# Patient Record
Sex: MALE | URBAN - METROPOLITAN AREA
[De-identification: names, ages, dates, MRNs, and addresses within clinical notes are randomized per-mention and may not be internally consistent; named-entity substitution may affect disease eponyms.]

---

## 2023-12-28 ENCOUNTER — OFFICE VISIT (OUTPATIENT)
Dept: URBAN - METROPOLITAN AREA CLINIC 54 | Facility: CLINIC | Age: 48
End: 2023-12-28

## 2023-12-28 NOTE — HPI-TODAY'S VISIT:
Agustin Norton is a 48-year-old male with PMH of who was referred to clinic by Timothy Davidson MD for screening colonoscopy.  A copy of this document will be sent to the referring provider.

## 2024-01-04 ENCOUNTER — LAB OUTSIDE AN ENCOUNTER (OUTPATIENT)
Dept: URBAN - METROPOLITAN AREA CLINIC 54 | Facility: CLINIC | Age: 49
End: 2024-01-04

## 2024-01-04 ENCOUNTER — OFFICE VISIT (OUTPATIENT)
Dept: URBAN - METROPOLITAN AREA CLINIC 54 | Facility: CLINIC | Age: 49
End: 2024-01-04
Payer: COMMERCIAL

## 2024-01-04 ENCOUNTER — DASHBOARD ENCOUNTERS (OUTPATIENT)
Age: 49
End: 2024-01-04

## 2024-01-04 VITALS
BODY MASS INDEX: 27.74 KG/M2 | DIASTOLIC BLOOD PRESSURE: 76 MMHG | TEMPERATURE: 98.2 F | SYSTOLIC BLOOD PRESSURE: 120 MMHG | HEART RATE: 57 BPM | HEIGHT: 68 IN | WEIGHT: 183 LBS

## 2024-01-04 DIAGNOSIS — Z12.11 COLON CANCER SCREENING: ICD-10-CM

## 2024-01-04 PROCEDURE — 99204 OFFICE O/P NEW MOD 45 MIN: CPT

## 2024-01-04 PROCEDURE — 99244 OFF/OP CNSLTJ NEW/EST MOD 40: CPT

## 2024-01-04 RX ORDER — SODIUM, POTASSIUM,MAG SULFATES 17.5-3.13G
354 ML AS DIRECTED SOLUTION, RECONSTITUTED, ORAL ORAL
Qty: 354 ML | Refills: 0 | OUTPATIENT
Start: 2024-01-04 | End: 2024-01-05

## 2024-01-04 NOTE — HPI-TODAY'S VISIT:
1/4/24: Patient was referred by Dr. Timothy Davidson for colon cancer screening. A copy of this document will be sent to the provider. Patient is a 47 yo male with a PMH of RIKI, GERD presents for initial screening colonoscopy. Denies any prior examination. Denies known family history of colon cancer. Denies any symptoms of concern. No changes in bowel habits, melena, rectal bleeding, abdominal pain, or unintentional weight loss. Patient denies cardiopulmonary disease and does not have a pacemaker or defibrillator. Denies use of anticoagulants or antiplatelets. CBC, CMP, and lipid panel were WNL last month. No recent imaging.

## 2024-01-26 ENCOUNTER — OFFICE VISIT (OUTPATIENT)
Dept: URBAN - METROPOLITAN AREA SURGERY CENTER 14 | Facility: SURGERY CENTER | Age: 49
End: 2024-01-26
Payer: COMMERCIAL

## 2024-01-26 DIAGNOSIS — D12.4 ADENOMA OF DESCENDING COLON: ICD-10-CM

## 2024-01-26 DIAGNOSIS — K63.5 BENIGN COLON POLYPS: ICD-10-CM

## 2024-01-26 DIAGNOSIS — K57.30 COLON, DIVERTICULOSIS: ICD-10-CM

## 2024-01-26 DIAGNOSIS — Z12.11 COLON CANCER SCREENING: ICD-10-CM

## 2024-01-26 PROCEDURE — 45385 COLONOSCOPY W/LESION REMOVAL: CPT | Performed by: INTERNAL MEDICINE

## 2024-01-26 PROCEDURE — G8907 PT DOC NO EVENTS ON DISCHARG: HCPCS | Performed by: INTERNAL MEDICINE

## 2024-01-26 PROCEDURE — 00812 ANES LWR INTST SCR COLSC: CPT | Performed by: NURSE ANESTHETIST, CERTIFIED REGISTERED
